# Patient Record
Sex: MALE | Race: WHITE | Employment: OTHER | URBAN - METROPOLITAN AREA
[De-identification: names, ages, dates, MRNs, and addresses within clinical notes are randomized per-mention and may not be internally consistent; named-entity substitution may affect disease eponyms.]

---

## 2018-12-17 ENCOUNTER — HOSPITAL ENCOUNTER (EMERGENCY)
Age: 73
Discharge: HOME | End: 2018-12-18
Attending: EMERGENCY MEDICINE | Admitting: EMERGENCY MEDICINE
Payer: MEDICARE

## 2018-12-17 ENCOUNTER — APPOINTMENT (OUTPATIENT)
Dept: CT IMAGING | Age: 73
End: 2018-12-17
Attending: EMERGENCY MEDICINE
Payer: MEDICARE

## 2018-12-17 DIAGNOSIS — H53.9 VISUAL DISTURBANCE OF ONE EYE: Primary | ICD-10-CM

## 2018-12-17 DIAGNOSIS — Z86.73 H/O: STROKE: ICD-10-CM

## 2018-12-17 LAB
ALBUMIN SERPL-MCNC: 3.7 G/DL (ref 3.4–5)
ALBUMIN/GLOB SERPL: 1.3 {RATIO} (ref 0.8–1.7)
ALP SERPL-CCNC: 49 U/L (ref 45–117)
ALT SERPL-CCNC: 52 U/L (ref 16–61)
ANION GAP SERPL CALC-SCNC: 7 MMOL/L (ref 3–18)
AST SERPL-CCNC: 33 U/L (ref 15–37)
BASOPHILS # BLD: 0.1 K/UL (ref 0–0.1)
BASOPHILS NFR BLD: 1 % (ref 0–2)
BILIRUB SERPL-MCNC: 0.3 MG/DL (ref 0.2–1)
BUN SERPL-MCNC: 15 MG/DL (ref 7–18)
BUN/CREAT SERPL: 17
CALCIUM SERPL-MCNC: 8.6 MG/DL (ref 8.5–10.1)
CHLORIDE SERPL-SCNC: 106 MMOL/L (ref 100–108)
CO2 SERPL-SCNC: 29 MMOL/L (ref 21–32)
CREAT SERPL-MCNC: 0.86 MG/DL (ref 0.6–1.3)
DIFFERENTIAL METHOD BLD: NORMAL
EOSINOPHIL # BLD: 0.1 K/UL (ref 0–0.4)
EOSINOPHIL NFR BLD: 2 % (ref 0–5)
ERYTHROCYTE [DISTWIDTH] IN BLOOD BY AUTOMATED COUNT: 14.1 % (ref 11.6–14.5)
GLOBULIN SER CALC-MCNC: 2.8 G/DL (ref 2–4)
GLUCOSE SERPL-MCNC: 92 MG/DL (ref 74–99)
HCT VFR BLD AUTO: 43.5 % (ref 36–48)
HGB BLD-MCNC: 13.8 G/DL (ref 13–16)
INR PPP: 0.9 (ref 0.8–1.2)
LYMPHOCYTES # BLD: 2.3 K/UL (ref 0.9–3.6)
LYMPHOCYTES NFR BLD: 37 % (ref 21–52)
MCH RBC QN AUTO: 29 PG (ref 24–34)
MCHC RBC AUTO-ENTMCNC: 31.7 G/DL (ref 31–37)
MCV RBC AUTO: 91.4 FL (ref 74–97)
MONOCYTES # BLD: 0.5 K/UL (ref 0.05–1.2)
MONOCYTES NFR BLD: 8 % (ref 3–10)
NEUTS SEG # BLD: 3.2 K/UL (ref 1.8–8)
NEUTS SEG NFR BLD: 52 % (ref 40–73)
PLATELET # BLD AUTO: 238 K/UL (ref 135–420)
PMV BLD AUTO: 10.7 FL (ref 9.2–11.8)
POTASSIUM SERPL-SCNC: 3.9 MMOL/L (ref 3.5–5.5)
PROT SERPL-MCNC: 6.5 G/DL (ref 6.4–8.2)
PROTHROMBIN TIME: 11.5 SEC (ref 11.5–15.2)
RBC # BLD AUTO: 4.76 M/UL (ref 4.7–5.5)
SODIUM SERPL-SCNC: 142 MMOL/L (ref 136–145)
WBC # BLD AUTO: 6.1 K/UL (ref 4.6–13.2)

## 2018-12-17 PROCEDURE — 74011000250 HC RX REV CODE- 250: Performed by: EMERGENCY MEDICINE

## 2018-12-17 PROCEDURE — 99283 EMERGENCY DEPT VISIT LOW MDM: CPT

## 2018-12-17 PROCEDURE — 85025 COMPLETE CBC W/AUTO DIFF WBC: CPT

## 2018-12-17 PROCEDURE — 70450 CT HEAD/BRAIN W/O DYE: CPT

## 2018-12-17 PROCEDURE — 85610 PROTHROMBIN TIME: CPT

## 2018-12-17 PROCEDURE — 80053 COMPREHEN METABOLIC PANEL: CPT

## 2018-12-17 RX ORDER — SODIUM CHLORIDE 0.9 % (FLUSH) 0.9 %
5-10 SYRINGE (ML) INJECTION AS NEEDED
Status: DISCONTINUED | OUTPATIENT
Start: 2018-12-17 | End: 2018-12-18 | Stop reason: HOSPADM

## 2018-12-17 RX ORDER — SODIUM CHLORIDE 0.9 % (FLUSH) 0.9 %
5-10 SYRINGE (ML) INJECTION EVERY 8 HOURS
Status: DISCONTINUED | OUTPATIENT
Start: 2018-12-17 | End: 2018-12-18 | Stop reason: HOSPADM

## 2018-12-17 RX ORDER — PROPARACAINE HYDROCHLORIDE 5 MG/ML
3 SOLUTION/ DROPS OPHTHALMIC
Status: COMPLETED | OUTPATIENT
Start: 2018-12-17 | End: 2018-12-17

## 2018-12-17 RX ADMIN — PROPARACAINE HYDROCHLORIDE 3 DROP: 5 SOLUTION/ DROPS OPHTHALMIC at 21:56

## 2018-12-17 RX ADMIN — FLUORESCEIN SODIUM 2 STRIP: 0.6 STRIP OPHTHALMIC at 21:56

## 2018-12-18 VITALS
OXYGEN SATURATION: 99 % | HEART RATE: 67 BPM | DIASTOLIC BLOOD PRESSURE: 69 MMHG | TEMPERATURE: 98 F | BODY MASS INDEX: 35 KG/M2 | HEIGHT: 71 IN | SYSTOLIC BLOOD PRESSURE: 131 MMHG | RESPIRATION RATE: 17 BRPM | WEIGHT: 250 LBS

## 2018-12-18 RX ORDER — ASPIRIN 81 MG/1
81 TABLET ORAL DAILY
COMMUNITY
Start: 2018-10-29

## 2018-12-18 RX ORDER — ATORVASTATIN CALCIUM 40 MG/1
40 TABLET, FILM COATED ORAL DAILY
COMMUNITY
Start: 2018-10-01

## 2018-12-18 RX ORDER — METFORMIN HYDROCHLORIDE 1000 MG/1
1000 TABLET ORAL 2 TIMES DAILY
COMMUNITY
Start: 2018-10-01

## 2018-12-18 RX ORDER — LOSARTAN POTASSIUM 50 MG/1
50 TABLET ORAL DAILY
COMMUNITY
Start: 2018-09-26

## 2018-12-18 RX ORDER — CYCLOBENZAPRINE HCL 10 MG
10 TABLET ORAL
COMMUNITY
Start: 2018-09-10

## 2018-12-18 RX ORDER — RAMIPRIL 5 MG/1
5 CAPSULE ORAL DAILY
COMMUNITY
Start: 2018-11-18

## 2018-12-18 NOTE — DISCHARGE INSTRUCTIONS
Floaters and Flashes: Care Instructions  Your Care Instructions    Floaters are spots and lines that \"float\" across your field of vision. They are caused by stray cells or strands of tissue inside the eyeball. Flashes are sparkles or lightning streaks. These occur in your side vision. This is called the peripheral vision. Floaters and flashes usually aren't serious. In many cases, they're a normal part of getting older. Some people get used to them. Others find them annoying. If floaters bother you, you can try to look up and then down. This may make them go away. For now, your doctor doesn't think your symptoms are a sign of a more serious problem. But an eye exam is the only way to know for sure. Follow-up care is a key part of your treatment and safety. Be sure to make and go to all appointments, and call your doctor if you are having problems. It's also a good idea to know your test results and keep a list of the medicines you take. When should you call for help? Call your doctor now or seek immediate medical care if:    · You have vision changes.    Watch closely for changes in your health, and be sure to contact your doctor if:    · You see new floaters.     · You see new flashes of light.     · You do not get better as expected. Where can you learn more? Go to http://kirill-ana.info/. Enter A161 in the search box to learn more about \"Floaters and Flashes: Care Instructions. \"  Current as of: December 3, 2017  Content Version: 11.8  © 7399-4601 [employer]. Care instructions adapted under license by [employer] (which disclaims liability or warranty for this information). If you have questions about a medical condition or this instruction, always ask your healthcare professional. [address] any warranty or liability for your use of this information.

## 2018-12-18 NOTE — ED PROVIDER NOTES
EMERGENCY DEPARTMENT HISTORY AND PHYSICAL EXAM    Date: 12/17/2018  Patient Name: Pam Cole    History of Presenting Illness     Chief Complaint   Patient presents with    Vision Change         History Provided By: Patient    Chief Complaint: Right eye visual disturbance  Duration: 2 Days  Timing:  Progressive  Location: Right eye   Quality: Floating black dot  Severity: Mild  Associated Symptoms: Eye irritatoin    Additional History (Context):   7:58 PM  Pam Cole is a 72 y.o. male with PMHX of atrial myxoma, 2 MIs, Agent Orange, CVA (1) and FHx of HTN, CVA, hydrocephalus, and MI who presents to the emergency department C/O progressive right eye visual disturbance, onset 2 days. Associated sxs include right eye irritation. Visual disturbance is a floating black dot that \"looks like a mosquito was flying around my glasses\". Today, when \"I looked up, the bottom of my eye turned black, but returned to normal when I looked down\". During previous CVA, pt stated his BP was 93/61 and he had a syncopal episode. Pt has an ophthalmology appointment in 2 days. Pt denies eye pain, HA, PMHx of A-fib, and any other sxs or complaints. PCP: Gordon Holguin MD        Past History     Past Medical History:  Past Medical History:   Diagnosis Date    Agent orange exposure     Ataxia     Brain atrophy     [facility] [facility]     Diabetes ([facility] [address] [facility]     Headache     Hypertension     Memory problem     MI (myocardial infarction) ([facility] [address]        Past Surgical History:  Past Surgical History:   Procedure Laterality Date    HX CHOLECYSTECTOMY      HX CORONARY STENT PLACEMENT         Family History:  History reviewed. No pertinent family history. Social History:  Social History     Tobacco Use    Smoking status: Never Smoker    Smokeless tobacco: Never Used   Substance Use Topics    Alcohol use: No     Frequency: Never    Drug use: Not on file       Allergies:   Allergies   Allergen Reactions  Adhesive Rash         Review of Systems   Review of Systems   Eyes: Positive for visual disturbance (right eye, floating black dot). Negative for pain. (+) Right eye irritation (\"feels as though there if foreign object in eye\")   Neurological: Negative for headaches. All other systems reviewed and are negative. Physical Exam     Vitals:    12/17/18 1948   BP: 112/61   Pulse: 63   Resp: 18   Temp: 98 °F (36.7 °C)   SpO2: 98%   Weight: 113.4 kg (250 lb)   Height: 5' 11\" (1.803 m)     Physical Exam   Constitutional: He is oriented to person, place, and time. He appears well-developed and well-nourished. Non-toxic appearance. No distress. Well-appearing, non-toxic   HENT:   Head: Normocephalic and atraumatic. Right Ear: External ear normal.   Left Ear: External ear normal.   Mouth/Throat: Oropharynx is clear and moist. No oropharyngeal exudate. Eyes: Conjunctivae and EOM are normal. Pupils are equal, round, and reactive to light. No scleral icterus. Slit lamp exam:       The right eye shows no corneal abrasion and no foreign body. No pallor. Subtle hazy appearance of right eye to his retina inferiorly, but no visual flap noted. On slit lamp exam, corneal surface appears to look clear. Slight irritation to the lateral aspect, specifically 8-9 o'clock position. No visualized foreign body. Neck: Normal range of motion. Neck supple. No JVD present. No tracheal deviation present. No thyromegaly present. Cardiovascular: Normal rate, regular rhythm and normal heart sounds. Pulmonary/Chest: Effort normal and breath sounds normal. No stridor. No respiratory distress. Abdominal: Soft. Bowel sounds are normal. He exhibits no distension. There is no tenderness. There is no rebound and no guarding. Musculoskeletal: Normal range of motion. He exhibits no edema or tenderness. No soft tissue injuries   Lymphadenopathy:     He has no cervical adenopathy.    Neurological: He is alert and oriented to person, place, and time. He has normal reflexes. No cranial nerve deficit. Coordination normal.   Skin: Skin is warm and dry. No rash noted. He is not diaphoretic. No erythema. Psychiatric: He has a normal mood and affect. His behavior is normal. Judgment and thought content normal.   Nursing note and vitals reviewed. Diagnostic Study Results     Labs -     Recent Results (from the past 12 hour(s))   CBC WITH AUTOMATED DIFF    Collection Time: 12/17/18 10:00 PM   Result Value Ref Range    WBC 6.1 4.6 - 13.2 K/uL    RBC 4.76 4.70 - 5.50 M/uL    HGB 13.8 13.0 - 16.0 g/dL    HCT 43.5 36.0 - 48.0 %    MCV 91.4 74.0 - 97.0 FL    MCH 29.0 24.0 - 34.0 PG    MCHC 31.7 31.0 - 37.0 g/dL    RDW 14.1 11.6 - 14.5 %    PLATELET 660 038 - 182 K/uL    MPV 10.7 9.2 - 11.8 FL    NEUTROPHILS 52 40 - 73 %    LYMPHOCYTES 37 21 - 52 %    MONOCYTES 8 3 - 10 %    EOSINOPHILS 2 0 - 5 %    BASOPHILS 1 0 - 2 %    ABS. NEUTROPHILS 3.2 1.8 - 8.0 K/UL    ABS. LYMPHOCYTES 2.3 0.9 - 3.6 K/UL    ABS. MONOCYTES 0.5 0.05 - 1.2 K/UL    ABS. EOSINOPHILS 0.1 0.0 - 0.4 K/UL    ABS. BASOPHILS 0.1 0.0 - 0.1 K/UL    DF AUTOMATED     METABOLIC PANEL, COMPREHENSIVE    Collection Time: 12/17/18 10:00 PM   Result Value Ref Range    Sodium 142 136 - 145 mmol/L    Potassium 3.9 3.5 - 5.5 mmol/L    Chloride 106 100 - 108 mmol/L    CO2 29 21 - 32 mmol/L    Anion gap 7 3.0 - 18 mmol/L    Glucose 92 74 - 99 mg/dL    BUN 15 7.0 - 18 MG/DL    Creatinine 0.86 0.6 - 1.3 MG/DL    BUN/Creatinine ratio 17      GFR est AA >60 >60 ml/min/1.73m2    GFR est non-AA >60 >60 ml/min/1.73m2    Calcium 8.6 8.5 - 10.1 MG/DL    Bilirubin, total 0.3 0.2 - 1.0 MG/DL    ALT (SGPT) 52 16 - 61 U/L    AST (SGOT) 33 15 - 37 U/L    Alk.  phosphatase 49 45 - 117 U/L    Protein, total 6.5 6.4 - 8.2 g/dL    Albumin 3.7 3.4 - 5.0 g/dL    Globulin 2.8 2.0 - 4.0 g/dL    A-G Ratio 1.3 0.8 - 1.7     PROTHROMBIN TIME + INR    Collection Time: 12/17/18 10:00 PM   Result Value Ref Range Prothrombin time 11.5 11.5 - 15.2 sec    INR 0.9 0.8 - 1.2         Radiologic Studies -   CT HEAD WO CONT    (Results Pending)   Impression:   No acute process  As read by the radiologist.  CT Results  (Last 48 hours)    None        CXR Results  (Last 48 hours)    None          Medications given in the ED-  Medications   sodium chloride (NS) flush 5-10 mL (not administered)   sodium chloride (NS) flush 5-10 mL (not administered)   proparacaine (OPTHAINE) 0.5 % ophthalmic solution 3 Drop (3 Drops Both Eyes Given by Provider 12/17/18 2156)   fluorescein (FLU-RICHARD) 0.6 mg ophthalmic strip 2 Strip (2 Strips Both Eyes Given by Provider 12/17/18 2156)         Medical Decision Making   I am the first provider for this patient. I reviewed the vital signs, available nursing notes, past medical history, past surgical history, family history and social history. Vital Signs-Reviewed the patient's vital signs. Pulse Oximetry Analysis - 98% on RA     Cardiac Monitor:  Rate: 63 bpm  Rhythm: NSR      Records Reviewed: Nursing Notes and Old Medical Records    Provider Notes (Medical Decision Making): Possible stroke but more likely CRVL or CRAL. His sxs are 48 hours old. Unlikely to be Code S. Possible stroke, will examine with CT. Review case with opthalmology. Likely outpatient f/u. Procedures:  Procedures    ED Course:   7:58 PM Initial assessment performed. The patients presenting problems have been discussed, and they are in agreement with the care plan formulated and outlined with them. I have encouraged them to ask questions as they arise throughout their visit. 12:13 AM Spoke with radiologist. [address] is no acute process. Pt is suitable for discharge. Diagnosis and Disposition       DISCHARGE NOTE:  12:13 AM  Ana Veras's  results have been reviewed with him. He has been counseled regarding his diagnosis, treatment, and plan.   He verbally conveys understanding and agreement of the signs, symptoms, diagnosis, treatment and prognosis and additionally agrees to follow up as discussed. He also agrees with the care-plan and conveys that all of his questions have been answered. I have also provided discharge instructions for him that include: educational information regarding their diagnosis and treatment, and list of reasons why they would want to return to the ED prior to their follow-up appointment, should his condition change. He has been provided with education for proper emergency department utilization. CLINICAL IMPRESSION:    No diagnosis found. PLAN:  1. D/C Home  2. There are no discharge medications for this patient. 3.   Follow-up Information    None       _______________________________    Attestations: This note is prepared by Kary Arzola, acting as Scribe for Jordan. Elliot Toro MD.    Jordan. Elliot Toro MD:  The scribe's documentation has been prepared under my direction and personally reviewed by me in its entirety.   I confirm that the note above accurately reflects all work, treatment, procedures, and medical decision making performed by me.  _______________________________

## 2018-12-18 NOTE — ED TRIAGE NOTES
Triage: pt reports loss of vision in right eye approx 2.5 hours ago. Pt states that the lower half of vision in his right eye \"went black. \" Pt states that he still has symptoms and that the line of black moves depending on where he moves his eye. Pt states he feels like there is a piece of plastic over his eye at this time and it appears blurry.

## 2018-12-18 NOTE — ED NOTES
Presented to ED to be evaluated for reported irritation to right eye x 2 days. Patient reports, \"i thought there was a bug that had gotten into my eye\". Patient reports, \"now like floaters\" to affected eye. Patient denies any known injury. Patient denies any complaints to left eye.